# Patient Record
Sex: MALE | Race: OTHER | NOT HISPANIC OR LATINO | URBAN - METROPOLITAN AREA
[De-identification: names, ages, dates, MRNs, and addresses within clinical notes are randomized per-mention and may not be internally consistent; named-entity substitution may affect disease eponyms.]

---

## 2017-05-02 ENCOUNTER — EMERGENCY (EMERGENCY)
Facility: HOSPITAL | Age: 27
LOS: 1 days | Discharge: HOME | End: 2017-05-02
Attending: EMERGENCY MEDICINE | Admitting: EMERGENCY MEDICINE
Payer: COMMERCIAL

## 2017-05-02 VITALS
TEMPERATURE: 99 F | RESPIRATION RATE: 16 BRPM | DIASTOLIC BLOOD PRESSURE: 78 MMHG | WEIGHT: 134.48 LBS | HEIGHT: 67 IN | OXYGEN SATURATION: 97 % | HEART RATE: 70 BPM | SYSTOLIC BLOOD PRESSURE: 118 MMHG

## 2017-05-02 DIAGNOSIS — T78.40XA ALLERGY, UNSPECIFIED, INITIAL ENCOUNTER: ICD-10-CM

## 2017-05-02 DIAGNOSIS — R05 COUGH: ICD-10-CM

## 2017-05-02 PROCEDURE — 99282 EMERGENCY DEPT VISIT SF MDM: CPT | Mod: 25

## 2017-05-02 PROCEDURE — 99282 EMERGENCY DEPT VISIT SF MDM: CPT

## 2017-05-02 RX ORDER — CETIRIZINE HYDROCHLORIDE 10 MG/1
1 TABLET ORAL
Qty: 14 | Refills: 0 | OUTPATIENT
Start: 2017-05-02 | End: 2017-05-16

## 2017-05-02 NOTE — ED PROVIDER NOTE - OBJECTIVE STATEMENT
27 y/o male with no sig pmhx c/o cough, nasal congestion and itchy eyes x 4 days. pt states using visine for eyes the past 2 days without relief. + tearing. no visual changes. no contact lens use. no fever or chills. no sinus pain. non productive cough. no cp or sob. no further complaints.

## 2017-05-02 NOTE — ED PROVIDER NOTE - MEDICAL DECISION MAKING DETAILS
ithcy watery eyes, nasal congestion and cough consistent with seasonal allergies. will tx with zyrtec and f/u with pmd.

## 2020-04-26 ENCOUNTER — MESSAGE (OUTPATIENT)
Age: 30
End: 2020-04-26

## 2020-05-12 ENCOUNTER — APPOINTMENT (OUTPATIENT)
Dept: DISASTER EMERGENCY | Facility: HOSPITAL | Age: 30
End: 2020-05-12

## 2020-05-12 LAB
SARS-COV-2 IGG SERPL IA-ACNC: 51.6 AU/ML
SARS-COV-2 IGG SERPL QL IA: POSITIVE

## 2022-02-26 ENCOUNTER — EMERGENCY (EMERGENCY)
Facility: HOSPITAL | Age: 32
LOS: 1 days | Discharge: HOME | End: 2022-02-26
Admitting: EMERGENCY MEDICINE
Payer: SELF-PAY

## 2022-02-26 VITALS
WEIGHT: 149.91 LBS | DIASTOLIC BLOOD PRESSURE: 77 MMHG | HEART RATE: 75 BPM | HEIGHT: 67 IN | OXYGEN SATURATION: 98 % | SYSTOLIC BLOOD PRESSURE: 135 MMHG | RESPIRATION RATE: 18 BRPM | TEMPERATURE: 98 F

## 2022-02-26 LAB
ANION GAP SERPL CALC-SCNC: 10 MMOL/L — SIGNIFICANT CHANGE UP (ref 5–17)
BASOPHILS # BLD AUTO: 0.04 K/UL — SIGNIFICANT CHANGE UP (ref 0–0.2)
BASOPHILS NFR BLD AUTO: 0.8 % — SIGNIFICANT CHANGE UP (ref 0–2)
BUN SERPL-MCNC: 17 MG/DL — SIGNIFICANT CHANGE UP (ref 7–23)
CALCIUM SERPL-MCNC: 9.8 MG/DL — SIGNIFICANT CHANGE UP (ref 8.4–10.5)
CHLORIDE SERPL-SCNC: 100 MMOL/L — SIGNIFICANT CHANGE UP (ref 96–108)
CO2 SERPL-SCNC: 30 MMOL/L — SIGNIFICANT CHANGE UP (ref 22–31)
CREAT SERPL-MCNC: 1.42 MG/DL — HIGH (ref 0.5–1.3)
EOSINOPHIL # BLD AUTO: 0.15 K/UL — SIGNIFICANT CHANGE UP (ref 0–0.5)
EOSINOPHIL NFR BLD AUTO: 3 % — SIGNIFICANT CHANGE UP (ref 0–6)
FLUAV AG NPH QL: SIGNIFICANT CHANGE UP
FLUBV AG NPH QL: SIGNIFICANT CHANGE UP
GLUCOSE SERPL-MCNC: 97 MG/DL — SIGNIFICANT CHANGE UP (ref 70–99)
HCT VFR BLD CALC: 44.1 % — SIGNIFICANT CHANGE UP (ref 39–50)
HGB BLD-MCNC: 14.8 G/DL — SIGNIFICANT CHANGE UP (ref 13–17)
HIV 1+2 AB+HIV1 P24 AG SERPL QL IA: SIGNIFICANT CHANGE UP
IMM GRANULOCYTES NFR BLD AUTO: 0.2 % — SIGNIFICANT CHANGE UP (ref 0–1.5)
LYMPHOCYTES # BLD AUTO: 2.44 K/UL — SIGNIFICANT CHANGE UP (ref 1–3.3)
LYMPHOCYTES # BLD AUTO: 49.3 % — HIGH (ref 13–44)
MCHC RBC-ENTMCNC: 28.9 PG — SIGNIFICANT CHANGE UP (ref 27–34)
MCHC RBC-ENTMCNC: 33.6 GM/DL — SIGNIFICANT CHANGE UP (ref 32–36)
MCV RBC AUTO: 86.1 FL — SIGNIFICANT CHANGE UP (ref 80–100)
MONOCYTES # BLD AUTO: 0.44 K/UL — SIGNIFICANT CHANGE UP (ref 0–0.9)
MONOCYTES NFR BLD AUTO: 8.9 % — SIGNIFICANT CHANGE UP (ref 2–14)
NEUTROPHILS # BLD AUTO: 1.87 K/UL — SIGNIFICANT CHANGE UP (ref 1.8–7.4)
NEUTROPHILS NFR BLD AUTO: 37.8 % — LOW (ref 43–77)
NRBC # BLD: 0 /100 WBCS — SIGNIFICANT CHANGE UP (ref 0–0)
PLATELET # BLD AUTO: 233 K/UL — SIGNIFICANT CHANGE UP (ref 150–400)
POTASSIUM SERPL-MCNC: 4.3 MMOL/L — SIGNIFICANT CHANGE UP (ref 3.5–5.3)
POTASSIUM SERPL-SCNC: 4.3 MMOL/L — SIGNIFICANT CHANGE UP (ref 3.5–5.3)
RBC # BLD: 5.12 M/UL — SIGNIFICANT CHANGE UP (ref 4.2–5.8)
RBC # FLD: 11.9 % — SIGNIFICANT CHANGE UP (ref 10.3–14.5)
RSV RNA NPH QL NAA+NON-PROBE: SIGNIFICANT CHANGE UP
SARS-COV-2 RNA SPEC QL NAA+PROBE: SIGNIFICANT CHANGE UP
SODIUM SERPL-SCNC: 140 MMOL/L — SIGNIFICANT CHANGE UP (ref 135–145)
WBC # BLD: 4.95 K/UL — SIGNIFICANT CHANGE UP (ref 3.8–10.5)
WBC # FLD AUTO: 4.95 K/UL — SIGNIFICANT CHANGE UP (ref 3.8–10.5)

## 2022-02-26 PROCEDURE — 96375 TX/PRO/DX INJ NEW DRUG ADDON: CPT

## 2022-02-26 PROCEDURE — 87637 SARSCOV2&INF A&B&RSV AMP PRB: CPT

## 2022-02-26 PROCEDURE — 85025 COMPLETE CBC W/AUTO DIFF WBC: CPT

## 2022-02-26 PROCEDURE — 99284 EMERGENCY DEPT VISIT MOD MDM: CPT

## 2022-02-26 PROCEDURE — 36415 COLL VENOUS BLD VENIPUNCTURE: CPT

## 2022-02-26 PROCEDURE — 87040 BLOOD CULTURE FOR BACTERIA: CPT

## 2022-02-26 PROCEDURE — 99284 EMERGENCY DEPT VISIT MOD MDM: CPT | Mod: 25

## 2022-02-26 PROCEDURE — 96365 THER/PROPH/DIAG IV INF INIT: CPT

## 2022-02-26 PROCEDURE — 87389 HIV-1 AG W/HIV-1&-2 AB AG IA: CPT

## 2022-02-26 PROCEDURE — 80048 BASIC METABOLIC PNL TOTAL CA: CPT

## 2022-02-26 RX ORDER — KETOROLAC TROMETHAMINE 30 MG/ML
15 SYRINGE (ML) INJECTION ONCE
Refills: 0 | Status: DISCONTINUED | OUTPATIENT
Start: 2022-02-26 | End: 2022-02-26

## 2022-02-26 RX ORDER — METOCLOPRAMIDE HCL 10 MG
10 TABLET ORAL ONCE
Refills: 0 | Status: COMPLETED | OUTPATIENT
Start: 2022-02-26 | End: 2022-02-26

## 2022-02-26 RX ORDER — SODIUM CHLORIDE 9 MG/ML
1000 INJECTION INTRAMUSCULAR; INTRAVENOUS; SUBCUTANEOUS ONCE
Refills: 0 | Status: COMPLETED | OUTPATIENT
Start: 2022-02-26 | End: 2022-02-26

## 2022-02-26 RX ADMIN — SODIUM CHLORIDE 1000 MILLILITER(S): 9 INJECTION INTRAMUSCULAR; INTRAVENOUS; SUBCUTANEOUS at 22:10

## 2022-02-26 RX ADMIN — SODIUM CHLORIDE 1000 MILLILITER(S): 9 INJECTION INTRAMUSCULAR; INTRAVENOUS; SUBCUTANEOUS at 20:45

## 2022-02-26 RX ADMIN — Medication 104 MILLIGRAM(S): at 20:45

## 2022-02-26 RX ADMIN — Medication 15 MILLIGRAM(S): at 22:10

## 2022-02-26 RX ADMIN — Medication 15 MILLIGRAM(S): at 20:45

## 2022-02-26 RX ADMIN — Medication 10 MILLIGRAM(S): at 22:10

## 2022-02-26 NOTE — ED PROVIDER NOTE - OBJECTIVE STATEMENT
32 yo generally healthy m with hx of "migraines" for years, better in recent years - now c/o a constant headache that he noticed upon waking up in the morning 5 days ago; it did not wake him from sleep.  The pain has been constant; located frontally/retroorbital, and bitemporal.  Has been taking Excedrin and ibuprofen without relief.  He reports feeling fatigued and feverish for the past few days.  Has not taken temp.  No dizziness, no stiff neck, no focal weakness or numbness.  No nasal congestion, rhinorrhea, throat pain, cough, trouble breathing, chest pain, palpitations, abdominal pain, NVD, urinary symptoms, testicular pain, back or joint pain.  Vaccinated for Covid, no booster.  Has not had flu vaccine this season.

## 2022-02-26 NOTE — ED PROVIDER NOTE - NSFOLLOWUPCLINICS_GEN_ALL_ED_FT
[facility]  Family Medicine  [address]  [place], [place] [address]  Phone: [phone]  Fax:

## 2022-02-26 NOTE — ED ADULT TRIAGE NOTE - CHIEF COMPLAINT QUOTE
Pt presents reporting headache, fatigue. Pt states "I usually get allergies this time of year." Pt reports taking excedrin and advil yesterday w/o relief.

## 2022-02-26 NOTE — ED PROVIDER NOTE - NSFOLLOWUPINSTRUCTIONS_ED_ALL_ED_FT
Your creatinine is elevated, which could indicate kidney disease.  Avoid NSAIDs (ie: naproxen, ibuprofen) because repeated use can harm the kidneys.  If you need pain medication, use Tylenol as directed. Stay well-hydrated, increase oral fluid intake.    Follow  up with primary care provider ([facility]) - call Monday to schedule.    Viral test results are pending and will be sent to your phone by text message, and available on patient portal.    Return to the Emergency Department if you have any new or worsening symptoms, or if you have any concerns.  =========================    General Headache    WHAT YOU NEED TO KNOW:    Headache pain may be mild or severe. Common causes include stress, medicines, and head injuries. Sleep problems, allergies, and hormone changes can also cause a headache. You may have frequent headaches that have no clear cause. Pain may start in another part of your body and move to your head. Headache pain can also move to other parts of your body. A headache can cause other symptoms, such as nausea and vomiting. A severe headache may be a sign of a stroke or other serious problem that needs immediate treatment.    DISCHARGE INSTRUCTIONS:    Have someone call your local emergency number (911 in the [place]) for any of the following:   •You have any of the following signs of a stroke: ?Numbness or drooping on one side of your face       ?Weakness in an arm or leg      ?Confusion or difficulty speaking      ?Dizziness, a severe headache, or vision loss    BE FAST SIGNS OF A STROKE             Return to the emergency department if:   •You have a headache with neck stiffness and a fever.      •You have a constant headache and are vomiting.      •You have severe pain that does not get better after you take pain medicine.      •You have a headache and the pain worsens when you look into light.      •You have a headache and vision changes, such as blurred vision.      •You have a headache and are forgetful or confused.      Call your doctor if:   •You have a headache each day that does not get better, even after treatment.      •You have changes in your headaches, or new symptoms that occur when you have a headache.      •Others you live or work with also have headaches.      •You have questions or concerns about your condition or care.      Medicines: You may need any of the following:   •Medicines may be given to prevent or treat headache pain. Do not wait until the pain is severe to take your medicine. Ask your healthcare provider how to take the medicine safely.      •NSAIDs, such as ibuprofen, help decrease swelling, pain, and fever. This medicine is available with or without a doctor's order. NSAIDs can cause stomach bleeding or kidney problems in certain people. If you take blood thinner medicine, always ask if NSAIDs are safe for you. Always read the medicine label and follow directions. Do not give these medicines to children under 6 months of age without direction from your child's healthcare provider.      •Acetaminophen decreases pain and fever. It is available without a doctor's order. Ask how much to take and how often to take it. Follow directions. Read the labels of all other medicines you are using to see if they also contain acetaminophen, or ask your doctor or pharmacist. Acetaminophen can cause liver damage if not taken correctly. Do not use more than 4 grams (4,000 milligrams) total of acetaminophen in one day.       •Antinausea medicine may be given to calm your stomach and help prevent vomiting.      •Take your medicine as directed. Contact your healthcare provider if you think your medicine is not helping or if you have side effects. Tell him of her if you are allergic to any medicine. Keep a list of the medicines, vitamins, and herbs you take. Include the amounts, and when and why you take them. Bring the list or the pill bottles to follow-up visits. Carry your medicine list with you in case of an emergency.      Manage your symptoms:   •Rest in a dark and quiet room. This may help decrease your pain.      •Apply heat or ice as directed. Heat or ice may help decrease pain or muscle spasms. Apply heat or ice on the area for 20 minutes every 2 hours for as many days as directed. Your healthcare provider may recommend that you alternate heat and ice.      •Relax your muscles to help relieve a headache. Lie down in a comfortable position and close your eyes. Relax your muscles slowly. Start at your toes and work your way up your body. A massage or warm bath may also help relax your muscles.      Keep a headache record: Record the dates and times that you get headaches, and what you were doing before the headache started. Also record what you ate and drank in the 24 hours before the headache started. This might help your healthcare provider find the cause of your headaches and make a treatment plan. The record can also help you avoid headache triggers or manage your symptoms.    Get enough sleep: You should get 8 to 10 hours of sleep each night. Create a sleep schedule. Go to bed and wake up at the same times each day. It may be helpful to do something relaxing before bed. Do not watch television right before bed.    Do not smoke: Nicotine and other chemicals in cigarettes and cigars can trigger a headache or make it worse. Ask your healthcare provider for information if you currently smoke and need help to quit. E-cigarettes or smokeless tobacco still contain nicotine. Talk to your healthcare provider before you use these products.    Drink liquids as directed: You may need to drink more liquid to prevent dehydration. Dehydration can cause a headache. Ask your healthcare provider how much liquid to drink each day and which liquids are best for you.    Limit caffeine and alcohol as directed: Your headaches may be triggered by caffeine or alcohol. You may also develop a headache if you drink caffeine regularly and suddenly stop.    Eat a variety of healthy foods: Do not skip meals. Too little food can trigger a headache. Include fruits, vegetables, whole-grain breads, low-fat dairy products, beans, lean meat, and fish. Do not have trigger foods, such as chocolate and red wine. Foods that contain gluten, nitrates, MSG, or artificial sweeteners may also trigger a headache.      Follow up with your doctor as directed: Write down your questions so you remember to ask them during your visits.   =================    Chronic Kidney Disease, Adult       Chronic kidney disease (CKD) occurs when the kidneys are slowly and permanently damaged over a long period of time. The kidneys are a pair of organs that do many important jobs in the body, including:  •Removing waste and extra fluid from the blood to make urine.      •Making hormones that maintain the amount of fluid in tissues and blood vessels.      •Maintaining the right amount of fluids and chemicals in the body.      A small amount of kidney damage may not cause problems, but a large amount of damage may make it hard or impossible for the kidneys to work right. Steps must be taken to slow kidney damage or to stop it from getting worse. If steps are not taken, the kidneys may stop working permanently (end-stage renal disease, or ESRD). Most of the time, CKD does not go away, but it can often be controlled. People who have CKD are usually able to live full lives.      What are the causes?    The most common causes of this condition are diabetes and high blood pressure (hypertension).    Other causes include:  •Cardiovascular diseases. These affect the heart and blood vessels.    •Kidney diseases. These include:  •Glomerulonephritis, or inflammation of the tiny filters in the kidneys.      •Interstitial nephritis. This is swelling of the small tubes of the kidneys and of the surrounding structures.      •Polycystic kidney disease, in which clusters of fluid-filled sacs form within the kidneys.      •Renal vascular disease. This includes disorders that affect the arteries and veins of the kidneys.      •Diseases that affect the body's defense system (immune system).    •A problem with urine flow. This may be caused by:  •Kidney stones.      •Cancer.      •An enlarged prostate, in males.        •A kidney infection or urinary tract infection (UTI) that keeps coming back.      •Vasculitis. This is swelling or inflammation of the blood vessels.        What increases the risk?    Your chances of having kidney disease increase with age.    The following factors may make you more likely to develop this condition:  •A family history of kidney disease or kidney failure. Kidney failure means the kidneys can no longer work right.      •Certain genetic diseases.      •Taking medicines often that are damaging to the kidneys.      •Being around or being in contact with toxic substances.      •Obesity.      •A history of tobacco use.        What are the signs or symptoms?    Symptoms of this condition include:  •Feeling very tired (lethargic) and having less energy.      •Swelling, or edema, of the face, legs, ankles, or feet.      •Nausea or vomiting, or loss of appetite.      •Confusion or trouble concentrating.      •Muscle twitches and cramps, especially in the legs.      •Dry, itchy skin.      •A metallic taste in the mouth.      •Producing less urine, or producing more urine (especially at night).      •Shortness of breath.      •Trouble sleeping.      CKD may also result in not having enough red blood cells or hemoglobin in the blood (anemia) or having weak bones (bone disease).    Symptoms develop slowly and may not be obvious until the kidney damage becomes severe. It is possible to have kidney disease for years without having symptoms.      How is this diagnosed?    This condition may be diagnosed based on:  •Blood tests.      •Urine tests.      •Imaging tests, such as an ultrasound or a CT scan.      •A kidney biopsy. This involves removing a sample of kidney tissue to be looked at under a microscope.      Results from these tests will help to determine how serious the CKD is.      How is this treated?    There is no cure for most cases of this condition, but treatment usually relieves symptoms and prevents or slows the worsening of the disease. Treatment may include:  •Diet changes, which may require you to avoid alcohol and foods that are high in salt, potassium, phosphorous, and protein.    •Medicines. These may:  •Lower blood pressure.      •Control blood sugar (glucose).      •Relieve anemia.      •Relieve swelling.      •Protect your bones.      •Improve the balance of salts and minerals in your blood (electrolytes).        •Dialysis, which is a type of treatment that removes toxic waste from the body. It may be needed if you have kidney failure.      •Managing any other conditions that are causing your CKD or making it worse.        Follow these instructions at home:    Medicines     •Take over-the-counter and prescription medicines only as told by your health care provider. The amount of some medicines that you take may need to be changed.      • Do not take any new medicines unless approved by your health care provider. Many medicines can make kidney damage worse.      • Do not take any vitamin and mineral supplements unless approved by your health care provider. Many nutritional supplements can make kidney damage worse.        Lifestyle      • Do not use any products that contain nicotine or tobacco, such as cigarettes, e-cigarettes, and chewing tobacco. If you need help quitting, ask your health care provider.    •If you drink alcohol:•Limit how much you use to:  •0–1 drink a day for women who are not pregnant.      •0–2 drinks a day for men.      •Know how much alcohol is in your drink. In the [place] one drink equals one 12 oz bottle of beer (355 mL), one 5 oz glass of wine (148 mL), or one 1½ oz glass of hard liquor (44 mL).      •Maintain a healthy weight. If you need help, ask your health care provider.        General instructions      •Follow instructions from your health care provider about eating or drinking restrictions, including any prescribed diet.      •Track your blood pressure at home. Report changes in your blood pressure as told.      •If you are being treated for diabetes, track your blood glucose levels as told.      •Start or continue an exercise plan. Exercise at least 30 minutes a day, 5 days a week.      •Keep your immunizations up to date as told.      •Keep all follow-up visits. This is important.        Where to find more information    •American Association of Kidney Patients: www.aakp.org      •National Kidney Foundation: www.kidney.org      •[employer] Kid[employer]: www.akfinc.org      •Life Options: www.lifeoptions.org      •Kidney [employer]: www.kidneyschool.org        Contact a health care provider if:    •Your symptoms get worse.    •You develop new symptoms.      Get help right away if:  •You develop symptoms of ESRD. These include:  •Headaches.      •Numbness in your hands or feet.      •Easy bruising.      •Frequent hiccups.      •Chest pain.      •Shortness of breath.      •Lack of menstrual periods, in women.      •You have a fever.      •You are producing less urine than usual.      •You have pain or bleeding when you urinate or when you have a bowel movement.      These symptoms may represent a serious problem that is an emergency. Do not wait to see if the symptoms will go away. Get medical help right away. Call your local emergency services (911 in the [place] Do not drive yourself to the hospital.       Summary    •Chronic kidney disease (CKD) occurs when the kidneys become damaged slowly over a long period of time.      •The most common causes of this condition are diabetes and high blood pressure (hypertension).      •There is no cure for most cases of CKD, but treatment usually relieves symptoms and prevents or slows the worsening of the disease. Treatment may include a combination of lifestyle changes, medicines, and dialysis.      This information is not intended to replace advice given to you by your health care provider. Make sure you discuss any questions you have with your health care provider.

## 2022-02-26 NOTE — ED PROVIDER NOTE - PATIENT PORTAL LINK FT
You can access the FollowMyHealth Patient Portal offered by [facility] by registering at the following website: [email or web address]. By joining [employer]’s FollowMyHealth portal, you will also be able to view your health information using other applications (apps) compatible with our system.

## 2022-02-26 NOTE — ED PROVIDER NOTE - CLINICAL SUMMARY MEDICAL DECISION MAKING FREE TEXT BOX
Headache; also with fatigue and subjective fever/chills, no documented fever. Symptom duration 6 days and stable.  Afebrile and nontoxic appearance in ED, no neck stiffness on exam, no photophobia on exam with light to eyes.  Nonfocal neuro exam.  Do not suspect meningitis.  No other localizing symptoms; suspicious for viral syndrome.  Will test for Covid and flu.  Will treat symptomatically with IV fluids, headache cocktail, reassess.

## 2022-02-26 NOTE — ED PROVIDER NOTE - PHYSICAL EXAMINATION
CONSTITUTIONAL: NAD, nontoxic appearance   SKIN: Normal color and turgor.    HEAD: NC/AT.  EYES: Conjunctiva clear. EOMI. PERRL without photophobia.    ENT: Airway clear. Normal voice.   RESPIRATORY:  Normal work of breathing. Lungs CTAB.  CARDIOVASCULAR:  RRR, S1S2. No M/R/G.      GI:  Abdomen soft, nontender.    MSK: Neck supple with painless ROM.  No cervical KENZIE.  No limb edema.  No muscular tenderness. No joint swelling or ROM limitation.    NEURO: Alert with clear sensorium.  Fluid, clear speech.  CN II-XII intact. 5/5 strength all ext. SILT all ext.  F-N normal. Normal steady gait.

## 2022-02-26 NOTE — ED ADULT NURSE NOTE - OBJECTIVE STATEMENT
31y male presents to ED c/o headache, fatigue, fever/chills x1 week. Pt denies blurry vision, pmh. A&Ox4.

## 2022-02-26 NOTE — ED PROVIDER NOTE - PROGRESS NOTE DETAILS
Patient states headache resolved and feels much better. Very well-appearing.  Discussed lab results with patient; advised of elevated creatinine and need for increased oral hydration and to avoid NSAIDs.  Does not have a PCP, says he will arrange primary care follow up at [facility].  Stable for DC.

## 2022-03-01 DIAGNOSIS — R51.9 HEADACHE, UNSPECIFIED: ICD-10-CM

## 2022-03-01 DIAGNOSIS — Z20.822 CONTACT WITH AND (SUSPECTED) EXPOSURE TO COVID-19: ICD-10-CM

## 2022-03-01 DIAGNOSIS — R53.83 OTHER FATIGUE: ICD-10-CM

## 2022-03-01 DIAGNOSIS — R68.83 CHILLS (WITHOUT FEVER): ICD-10-CM

## 2022-03-03 LAB
CULTURE RESULTS: SIGNIFICANT CHANGE UP
CULTURE RESULTS: SIGNIFICANT CHANGE UP
SPECIMEN SOURCE: SIGNIFICANT CHANGE UP
SPECIMEN SOURCE: SIGNIFICANT CHANGE UP

## 2022-03-15 ENCOUNTER — APPOINTMENT (OUTPATIENT)
Dept: INTERNAL MEDICINE | Facility: CLINIC | Age: 32
End: 2022-03-15
Payer: COMMERCIAL

## 2022-03-15 ENCOUNTER — NON-APPOINTMENT (OUTPATIENT)
Age: 32
End: 2022-03-15

## 2022-03-15 VITALS
WEIGHT: 150 LBS | TEMPERATURE: 98.1 F | OXYGEN SATURATION: 98 % | HEART RATE: 71 BPM | SYSTOLIC BLOOD PRESSURE: 120 MMHG | DIASTOLIC BLOOD PRESSURE: 80 MMHG | RESPIRATION RATE: 16 BRPM

## 2022-03-15 DIAGNOSIS — R53.83 OTHER FATIGUE: ICD-10-CM

## 2022-03-15 DIAGNOSIS — Z82.49 FAMILY HISTORY OF ISCHEMIC HEART DISEASE AND OTHER DISEASES OF THE CIRCULATORY SYSTEM: ICD-10-CM

## 2022-03-15 DIAGNOSIS — Z78.9 OTHER SPECIFIED HEALTH STATUS: ICD-10-CM

## 2022-03-15 DIAGNOSIS — N17.9 ACUTE KIDNEY FAILURE, UNSPECIFIED: ICD-10-CM

## 2022-03-15 PROCEDURE — 99204 OFFICE O/P NEW MOD 45 MIN: CPT | Mod: 25,GC

## 2022-03-15 PROCEDURE — 36415 COLL VENOUS BLD VENIPUNCTURE: CPT

## 2022-03-16 ENCOUNTER — TRANSCRIPTION ENCOUNTER (OUTPATIENT)
Age: 32
End: 2022-03-16

## 2022-03-17 ENCOUNTER — NON-APPOINTMENT (OUTPATIENT)
Age: 32
End: 2022-03-17

## 2022-03-17 LAB
ANION GAP SERPL CALC-SCNC: 14 MMOL/L
APPEARANCE: CLEAR
BILIRUBIN URINE: NEGATIVE
BLOOD URINE: NEGATIVE
BUN SERPL-MCNC: 15 MG/DL
CALCIUM SERPL-MCNC: 10.2 MG/DL
CHLORIDE SERPL-SCNC: 103 MMOL/L
CO2 SERPL-SCNC: 24 MMOL/L
COLOR: YELLOW
CREAT SERPL-MCNC: 1.29 MG/DL
EGFR: 76 ML/MIN/1.73M2
GLUCOSE QUALITATIVE U: NEGATIVE
GLUCOSE SERPL-MCNC: 89 MG/DL
KETONES URINE: NEGATIVE
LEUKOCYTE ESTERASE URINE: NEGATIVE
NITRITE URINE: NEGATIVE
PH URINE: 6
POTASSIUM SERPL-SCNC: 4.4 MMOL/L
PROTEIN URINE: NORMAL
SODIUM SERPL-SCNC: 141 MMOL/L
SPECIFIC GRAVITY URINE: 1.03
TSH SERPL-ACNC: 1.27 UIU/ML
UROBILINOGEN URINE: NORMAL

## 2022-09-09 ENCOUNTER — APPOINTMENT (OUTPATIENT)
Dept: INTERNAL MEDICINE | Facility: CLINIC | Age: 32
End: 2022-09-09

## 2022-09-16 ENCOUNTER — LABORATORY RESULT (OUTPATIENT)
Age: 32
End: 2022-09-16

## 2022-09-16 ENCOUNTER — APPOINTMENT (OUTPATIENT)
Dept: INTERNAL MEDICINE | Facility: CLINIC | Age: 32
End: 2022-09-16

## 2022-09-16 VITALS
OXYGEN SATURATION: 95 % | DIASTOLIC BLOOD PRESSURE: 79 MMHG | HEART RATE: 67 BPM | SYSTOLIC BLOOD PRESSURE: 118 MMHG | BODY MASS INDEX: 26.06 KG/M2 | TEMPERATURE: 98.6 F | HEIGHT: 67 IN | WEIGHT: 166 LBS

## 2022-09-16 DIAGNOSIS — G43.909 MIGRAINE, UNSPECIFIED, NOT INTRACTABLE, W/OUT STATUS MIGRAINOSUS: ICD-10-CM

## 2022-09-16 DIAGNOSIS — Z13.220 ENCOUNTER FOR SCREENING FOR LIPOID DISORDERS: ICD-10-CM

## 2022-09-16 DIAGNOSIS — Z00.00 ENCOUNTER FOR GENERAL ADULT MEDICAL EXAMINATION W/OUT ABNORMAL FINDINGS: ICD-10-CM

## 2022-09-16 DIAGNOSIS — Z13.31 ENCOUNTER FOR SCREENING FOR DEPRESSION: ICD-10-CM

## 2022-09-16 DIAGNOSIS — Z11.3 ENCOUNTER FOR SCREENING FOR INFECTIONS WITH A PREDOMINANTLY SEXUAL MODE OF TRANSMISSION: ICD-10-CM

## 2022-09-16 DIAGNOSIS — Z00.01 ENCOUNTER FOR GENERAL ADULT MEDICAL EXAMINATION WITH ABNORMAL FINDINGS: ICD-10-CM

## 2022-09-16 PROCEDURE — 36415 COLL VENOUS BLD VENIPUNCTURE: CPT

## 2022-09-16 PROCEDURE — 99395 PREV VISIT EST AGE 18-39: CPT | Mod: 25

## 2022-09-16 PROCEDURE — 99214 OFFICE O/P EST MOD 30 MIN: CPT | Mod: 25,GC

## 2022-09-16 RX ORDER — SUMATRIPTAN 50 MG/1
50 TABLET, FILM COATED ORAL
Qty: 8 | Refills: 1 | Status: ACTIVE | COMMUNITY
Start: 2022-09-16 | End: 1900-01-01

## 2022-09-21 LAB
CHOLEST SERPL-MCNC: 195 MG/DL
HCV AB SER QL: NONREACTIVE
HCV S/CO RATIO: 0.15 S/CO
HDLC SERPL-MCNC: 43 MG/DL
HIV1+2 AB SPEC QL IA.RAPID: NONREACTIVE
LDLC SERPL CALC-MCNC: 138 MG/DL
NONHDLC SERPL-MCNC: 152 MG/DL
T PALLIDUM AB SER QL IA: NEGATIVE
TRIGL SERPL-MCNC: 71 MG/DL

## 2022-11-02 ENCOUNTER — APPOINTMENT (OUTPATIENT)
Dept: INTERNAL MEDICINE | Facility: CLINIC | Age: 32
End: 2022-11-02